# Patient Record
Sex: FEMALE | Race: WHITE | NOT HISPANIC OR LATINO
[De-identification: names, ages, dates, MRNs, and addresses within clinical notes are randomized per-mention and may not be internally consistent; named-entity substitution may affect disease eponyms.]

---

## 2017-01-03 ENCOUNTER — FOLLOW UP (OUTPATIENT)
Age: 57
End: 2017-01-03

## 2017-01-03 DIAGNOSIS — E11.3212: ICD-10-CM

## 2017-01-03 DIAGNOSIS — Z79.4: ICD-10-CM

## 2017-01-03 DIAGNOSIS — E11.3211: ICD-10-CM

## 2017-01-03 PROCEDURE — 92134 CPTRZ OPH DX IMG PST SGM RTA: CPT

## 2017-01-03 PROCEDURE — 92012 INTRM OPH EXAM EST PATIENT: CPT

## 2017-01-03 ASSESSMENT — TONOMETRY
OD_IOP_MMHG: 13
OS_IOP_MMHG: 13

## 2017-01-03 ASSESSMENT — VISUAL ACUITY
OS_CC: 20/30-
OD_PH: 20/25-
OD_CC: 20/30-

## 2017-01-20 ENCOUNTER — FOLLOW UP (OUTPATIENT)
Age: 57
End: 2017-01-20

## 2017-01-20 DIAGNOSIS — H25.13: ICD-10-CM

## 2017-01-20 DIAGNOSIS — E11.3212: ICD-10-CM

## 2017-01-20 DIAGNOSIS — E11.3211: ICD-10-CM

## 2017-01-20 DIAGNOSIS — Z79.4: ICD-10-CM

## 2017-01-20 PROCEDURE — 92012 INTRM OPH EXAM EST PATIENT: CPT | Mod: 25

## 2017-01-20 PROCEDURE — 92134 CPTRZ OPH DX IMG PST SGM RTA: CPT

## 2017-01-20 PROCEDURE — 67028 INJECTION EYE DRUG: CPT

## 2017-01-20 ASSESSMENT — VISUAL ACUITY
OD_PH: 20/30-1
OS_CC: 20/30-1
OD_CC: 20/40-2

## 2017-01-20 ASSESSMENT — TONOMETRY
OD_IOP_MMHG: 14
OS_IOP_MMHG: 16

## 2017-03-07 ENCOUNTER — FOLLOW UP (OUTPATIENT)
Age: 57
End: 2017-03-07

## 2017-03-07 DIAGNOSIS — E11.3211: ICD-10-CM

## 2017-03-07 DIAGNOSIS — Z79.4: ICD-10-CM

## 2017-03-07 DIAGNOSIS — E11.3212: ICD-10-CM

## 2017-03-07 DIAGNOSIS — H25.13: ICD-10-CM

## 2017-03-07 PROCEDURE — 92014 COMPRE OPH EXAM EST PT 1/>: CPT | Mod: 25

## 2017-03-07 PROCEDURE — 92134 CPTRZ OPH DX IMG PST SGM RTA: CPT

## 2017-03-07 PROCEDURE — 67028 INJECTION EYE DRUG: CPT

## 2017-03-07 ASSESSMENT — TONOMETRY
OS_IOP_MMHG: 17
OD_IOP_MMHG: 16

## 2017-03-07 ASSESSMENT — VISUAL ACUITY
OS_CC: 20/40+1
OD_CC: 20/30+1

## 2017-04-18 ENCOUNTER — FOLLOW UP (OUTPATIENT)
Age: 57
End: 2017-04-18

## 2017-04-18 DIAGNOSIS — E11.3211: ICD-10-CM

## 2017-04-18 DIAGNOSIS — E11.3212: ICD-10-CM

## 2017-04-18 DIAGNOSIS — Z79.4: ICD-10-CM

## 2017-04-18 PROCEDURE — 92012 INTRM OPH EXAM EST PATIENT: CPT | Mod: 25

## 2017-04-18 PROCEDURE — 67028 INJECTION EYE DRUG: CPT

## 2017-04-18 PROCEDURE — 92134 CPTRZ OPH DX IMG PST SGM RTA: CPT

## 2017-04-18 ASSESSMENT — VISUAL ACUITY
OS_CC: 20/30-2
OD_PH: 20/25-
OS_PH: 20/30+
OD_CC: 20/30+1

## 2017-04-18 ASSESSMENT — TONOMETRY
OD_IOP_MMHG: 21
OS_IOP_MMHG: 19

## 2017-06-06 ENCOUNTER — FOLLOW UP (OUTPATIENT)
Age: 57
End: 2017-06-06

## 2017-06-06 DIAGNOSIS — H25.13: ICD-10-CM

## 2017-06-06 DIAGNOSIS — E11.3212: ICD-10-CM

## 2017-06-06 DIAGNOSIS — E11.3211: ICD-10-CM

## 2017-06-06 DIAGNOSIS — Z79.4: ICD-10-CM

## 2017-06-06 PROCEDURE — 67028 INJECTION EYE DRUG: CPT

## 2017-06-06 PROCEDURE — 92134 CPTRZ OPH DX IMG PST SGM RTA: CPT

## 2017-06-06 PROCEDURE — 92014 COMPRE OPH EXAM EST PT 1/>: CPT | Mod: 25

## 2017-06-06 ASSESSMENT — VISUAL ACUITY
OD_CC: 20/30-
OS_CC: 20/50+2
OD_PH: 20/25-2
OS_PH: 20/30-

## 2017-06-06 ASSESSMENT — TONOMETRY
OD_IOP_MMHG: 18
OS_IOP_MMHG: 20

## 2017-07-14 ENCOUNTER — APPOINTMENT (RX ONLY)
Dept: URBAN - METROPOLITAN AREA CLINIC 348 | Facility: CLINIC | Age: 57
Setting detail: DERMATOLOGY
End: 2017-07-14

## 2017-07-14 DIAGNOSIS — D22 MELANOCYTIC NEVI: ICD-10-CM

## 2017-07-14 DIAGNOSIS — L82.1 OTHER SEBORRHEIC KERATOSIS: ICD-10-CM

## 2017-07-14 PROBLEM — D22.39 MELANOCYTIC NEVI OF OTHER PARTS OF FACE: Status: ACTIVE | Noted: 2017-07-14

## 2017-07-14 PROBLEM — L85.3 XEROSIS CUTIS: Status: ACTIVE | Noted: 2017-07-14

## 2017-07-14 PROBLEM — E13.9 OTHER SPECIFIED DIABETES MELLITUS WITHOUT COMPLICATIONS: Status: ACTIVE | Noted: 2017-07-14

## 2017-07-14 PROCEDURE — ? OTHER

## 2017-07-14 PROCEDURE — ? TREATMENT REGIMEN

## 2017-07-14 PROCEDURE — 99203 OFFICE O/P NEW LOW 30 MIN: CPT

## 2017-07-14 PROCEDURE — ? COUNSELING

## 2017-07-14 ASSESSMENT — LOCATION SIMPLE DESCRIPTION DERM
LOCATION SIMPLE: CHIN
LOCATION SIMPLE: LEFT CHEEK

## 2017-07-14 ASSESSMENT — LOCATION ZONE DERM: LOCATION ZONE: FACE

## 2017-07-14 ASSESSMENT — LOCATION DETAILED DESCRIPTION DERM
LOCATION DETAILED: LEFT CHIN
LOCATION DETAILED: LEFT INFERIOR CENTRAL MALAR CHEEK

## 2017-07-14 NOTE — PROCEDURE: OTHER
Note Text (......Xxx Chief Complaint.): This diagnosis correlates with the
Detail Level: Simple
Other (Free Text): Photos taken at today's visit

## 2017-08-11 ENCOUNTER — FOLLOW UP (OUTPATIENT)
Age: 57
End: 2017-08-11

## 2017-08-11 DIAGNOSIS — E11.3212: ICD-10-CM

## 2017-08-11 DIAGNOSIS — Z79.4: ICD-10-CM

## 2017-08-11 DIAGNOSIS — H25.13: ICD-10-CM

## 2017-08-11 DIAGNOSIS — E11.3211: ICD-10-CM

## 2017-08-11 PROCEDURE — 92134 CPTRZ OPH DX IMG PST SGM RTA: CPT

## 2017-08-11 PROCEDURE — 92014 COMPRE OPH EXAM EST PT 1/>: CPT

## 2017-08-11 ASSESSMENT — VISUAL ACUITY
OD_CC: 20/30-
OS_CC: 20/50+
OD_PH: 20/25-1
OS_PH: 20/30-

## 2017-08-11 ASSESSMENT — TONOMETRY
OS_IOP_MMHG: 15
OD_IOP_MMHG: 12

## 2017-09-19 ENCOUNTER — FOLLOW UP (OUTPATIENT)
Age: 57
End: 2017-09-19

## 2017-09-19 DIAGNOSIS — E11.3212: ICD-10-CM

## 2017-09-19 DIAGNOSIS — H25.13: ICD-10-CM

## 2017-09-19 DIAGNOSIS — Z79.4: ICD-10-CM

## 2017-09-19 DIAGNOSIS — E11.3211: ICD-10-CM

## 2017-09-19 PROCEDURE — 92012 INTRM OPH EXAM EST PATIENT: CPT

## 2017-09-19 PROCEDURE — 92134 CPTRZ OPH DX IMG PST SGM RTA: CPT

## 2017-09-19 ASSESSMENT — TONOMETRY
OS_IOP_MMHG: 19
OD_IOP_MMHG: 18

## 2017-09-19 ASSESSMENT — VISUAL ACUITY
OS_PH: 20/40
OD_CC: 20/30-1
OS_CC: 20/50-1

## 2017-11-03 ENCOUNTER — FOLLOW UP (OUTPATIENT)
Age: 57
End: 2017-11-03

## 2017-11-03 DIAGNOSIS — E11.3211: ICD-10-CM

## 2017-11-03 DIAGNOSIS — H25.13: ICD-10-CM

## 2017-11-03 DIAGNOSIS — E11.3212: ICD-10-CM

## 2017-11-03 DIAGNOSIS — Z79.4: ICD-10-CM

## 2017-11-03 PROCEDURE — 92134 CPTRZ OPH DX IMG PST SGM RTA: CPT

## 2017-11-03 PROCEDURE — 92014 COMPRE OPH EXAM EST PT 1/>: CPT

## 2017-11-03 ASSESSMENT — VISUAL ACUITY
OD_CC: 20/25
OS_CC: 20/40+1

## 2017-11-03 ASSESSMENT — TONOMETRY
OS_IOP_MMHG: 16
OD_IOP_MMHG: 15

## 2018-02-02 ENCOUNTER — FOLLOW UP (OUTPATIENT)
Age: 58
End: 2018-02-02

## 2018-02-02 DIAGNOSIS — Z79.4: ICD-10-CM

## 2018-02-02 DIAGNOSIS — E11.3211: ICD-10-CM

## 2018-02-02 DIAGNOSIS — E11.3212: ICD-10-CM

## 2018-02-02 DIAGNOSIS — H25.13: ICD-10-CM

## 2018-02-02 PROCEDURE — 92134 CPTRZ OPH DX IMG PST SGM RTA: CPT

## 2018-02-02 PROCEDURE — 92014 COMPRE OPH EXAM EST PT 1/>: CPT

## 2018-02-02 ASSESSMENT — TONOMETRY
OS_IOP_MMHG: 17
OD_IOP_MMHG: 21

## 2018-02-02 ASSESSMENT — VISUAL ACUITY
OS_CC: 20/40-1
OD_CC: 20/30-1

## 2018-03-09 ENCOUNTER — FOLLOW UP (OUTPATIENT)
Age: 58
End: 2018-03-09

## 2018-03-09 DIAGNOSIS — E11.3211: ICD-10-CM

## 2018-03-09 DIAGNOSIS — Z79.4: ICD-10-CM

## 2018-03-09 DIAGNOSIS — E11.3212: ICD-10-CM

## 2018-03-09 PROCEDURE — 92012 INTRM OPH EXAM EST PATIENT: CPT

## 2018-03-09 PROCEDURE — 92134 CPTRZ OPH DX IMG PST SGM RTA: CPT

## 2018-03-09 ASSESSMENT — VISUAL ACUITY
OD_CC: 20/30-1
OS_CC: 20/40-2
OD_PH: 20/25-3
OS_PH: 20/30-1

## 2018-03-09 ASSESSMENT — TONOMETRY
OS_IOP_MMHG: 18
OD_IOP_MMHG: 21

## 2018-05-11 ENCOUNTER — FOLLOW UP (OUTPATIENT)
Age: 58
End: 2018-05-11

## 2018-05-11 DIAGNOSIS — E11.3212: ICD-10-CM

## 2018-05-11 DIAGNOSIS — H25.13: ICD-10-CM

## 2018-05-11 DIAGNOSIS — E11.3211: ICD-10-CM

## 2018-05-11 DIAGNOSIS — Z79.4: ICD-10-CM

## 2018-05-11 PROCEDURE — 92134 CPTRZ OPH DX IMG PST SGM RTA: CPT

## 2018-05-11 PROCEDURE — 92014 COMPRE OPH EXAM EST PT 1/>: CPT

## 2018-05-11 ASSESSMENT — TONOMETRY
OS_IOP_MMHG: 18
OD_IOP_MMHG: 17

## 2018-05-11 ASSESSMENT — VISUAL ACUITY
OD_CC: 20/30-2
OS_CC: 20/50+1

## 2018-09-04 ENCOUNTER — FOLLOW UP (OUTPATIENT)
Age: 58
End: 2018-09-04

## 2018-09-04 DIAGNOSIS — H25.13: ICD-10-CM

## 2018-09-04 DIAGNOSIS — Z79.4: ICD-10-CM

## 2018-09-04 DIAGNOSIS — E11.3212: ICD-10-CM

## 2018-09-04 DIAGNOSIS — E11.3211: ICD-10-CM

## 2018-09-04 PROCEDURE — 92134 CPTRZ OPH DX IMG PST SGM RTA: CPT

## 2018-09-04 PROCEDURE — 92014 COMPRE OPH EXAM EST PT 1/>: CPT

## 2018-09-04 ASSESSMENT — VISUAL ACUITY
OS_CC: 20/50-
OS_PH: 20/30-
OD_CC: 20/25-

## 2018-09-04 ASSESSMENT — TONOMETRY
OD_IOP_MMHG: 15
OS_IOP_MMHG: 14

## 2019-01-09 ENCOUNTER — FOLLOW UP (OUTPATIENT)
Age: 59
End: 2019-01-09

## 2019-01-09 DIAGNOSIS — H43.812: ICD-10-CM

## 2019-01-09 DIAGNOSIS — Z79.4: ICD-10-CM

## 2019-01-09 DIAGNOSIS — E11.3212: ICD-10-CM

## 2019-01-09 DIAGNOSIS — E11.3211: ICD-10-CM

## 2019-01-09 PROCEDURE — 92134 CPTRZ OPH DX IMG PST SGM RTA: CPT

## 2019-01-09 PROCEDURE — 92014 COMPRE OPH EXAM EST PT 1/>: CPT

## 2019-01-09 ASSESSMENT — VISUAL ACUITY
OD_CC: 20/25+2
OS_PH: 20/40-2
OS_CC: 20/70-1

## 2019-01-09 ASSESSMENT — TONOMETRY
OD_IOP_MMHG: 18
OS_IOP_MMHG: 17

## 2019-02-08 ENCOUNTER — FOLLOW UP (OUTPATIENT)
Age: 59
End: 2019-02-08

## 2019-02-08 DIAGNOSIS — E11.3211: ICD-10-CM

## 2019-02-08 DIAGNOSIS — Z79.4: ICD-10-CM

## 2019-02-08 DIAGNOSIS — E11.3212: ICD-10-CM

## 2019-02-08 PROCEDURE — 92012 INTRM OPH EXAM EST PATIENT: CPT

## 2019-02-08 PROCEDURE — 92134 CPTRZ OPH DX IMG PST SGM RTA: CPT

## 2019-02-08 ASSESSMENT — VISUAL ACUITY
OS_PH: 20/60-1
OD_CC: 20/25-2
OS_CC: 20/80+2

## 2019-02-08 ASSESSMENT — TONOMETRY
OD_IOP_MMHG: 20
OS_IOP_MMHG: 18

## 2019-02-22 ENCOUNTER — FOLLOW UP (OUTPATIENT)
Age: 59
End: 2019-02-22

## 2019-02-22 DIAGNOSIS — Z79.4: ICD-10-CM

## 2019-02-22 DIAGNOSIS — E11.3212: ICD-10-CM

## 2019-02-22 PROCEDURE — J0178S EYLEA SAMPLE

## 2019-02-22 PROCEDURE — 67028 INJECTION EYE DRUG: CPT

## 2019-02-22 ASSESSMENT — VISUAL ACUITY
OS_CC: 20/60-2
OD_CC: 20/25-2
OS_PH: 20/50-1

## 2019-02-22 ASSESSMENT — TONOMETRY
OS_IOP_MMHG: 14
OD_IOP_MMHG: 18

## 2019-03-19 ENCOUNTER — FOLLOW UP (OUTPATIENT)
Age: 59
End: 2019-03-19

## 2019-03-19 DIAGNOSIS — E11.3212: ICD-10-CM

## 2019-03-19 DIAGNOSIS — Z79.4: ICD-10-CM

## 2019-03-19 DIAGNOSIS — E11.3211: ICD-10-CM

## 2019-03-19 PROCEDURE — 92134 CPTRZ OPH DX IMG PST SGM RTA: CPT

## 2019-03-19 PROCEDURE — 92014 COMPRE OPH EXAM EST PT 1/>: CPT

## 2019-03-19 ASSESSMENT — TONOMETRY
OS_IOP_MMHG: 16
OD_IOP_MMHG: 18

## 2019-03-19 ASSESSMENT — VISUAL ACUITY
OS_CC: 20/50-2
OD_CC: 20/25-2
OS_PH: 20/40+1

## 2019-03-26 ENCOUNTER — INJECTION ONLY (OUTPATIENT)
Age: 59
End: 2019-03-26

## 2019-03-26 DIAGNOSIS — Z79.4: ICD-10-CM

## 2019-03-26 DIAGNOSIS — E11.3212: ICD-10-CM

## 2019-03-26 PROCEDURE — 67028 INJECTION EYE DRUG: CPT

## 2019-03-26 ASSESSMENT — TONOMETRY
OD_IOP_MMHG: 17
OS_IOP_MMHG: 15

## 2019-03-26 ASSESSMENT — VISUAL ACUITY
OD_CC: 20/25-1
OS_CC: 20/40-1

## 2019-04-26 ENCOUNTER — FOLLOW UP (OUTPATIENT)
Age: 59
End: 2019-04-26

## 2019-04-26 DIAGNOSIS — Z79.4: ICD-10-CM

## 2019-04-26 DIAGNOSIS — E11.3212: ICD-10-CM

## 2019-04-26 DIAGNOSIS — E11.3211: ICD-10-CM

## 2019-04-26 PROCEDURE — 92134 CPTRZ OPH DX IMG PST SGM RTA: CPT

## 2019-04-26 PROCEDURE — 92012 INTRM OPH EXAM EST PATIENT: CPT

## 2019-04-26 ASSESSMENT — VISUAL ACUITY
OS_PH: 20/40+2
OD_CC: 20/30-2
OS_CC: 20/50-2

## 2019-04-26 ASSESSMENT — TONOMETRY
OD_IOP_MMHG: 15
OS_IOP_MMHG: 13

## 2019-06-12 ENCOUNTER — FOLLOW UP (OUTPATIENT)
Age: 59
End: 2019-06-12

## 2019-06-12 DIAGNOSIS — E11.3212: ICD-10-CM

## 2019-06-12 DIAGNOSIS — H25.13: ICD-10-CM

## 2019-06-12 DIAGNOSIS — E11.3211: ICD-10-CM

## 2019-06-12 DIAGNOSIS — Z79.4: ICD-10-CM

## 2019-06-12 PROCEDURE — 92014 COMPRE OPH EXAM EST PT 1/>: CPT

## 2019-06-12 PROCEDURE — 92134 CPTRZ OPH DX IMG PST SGM RTA: CPT

## 2019-06-12 ASSESSMENT — TONOMETRY
OD_IOP_MMHG: 15
OS_IOP_MMHG: 18

## 2019-06-12 ASSESSMENT — VISUAL ACUITY
OS_CC: 20/70
OD_CC: 20/30+2
OS_PH: 20/50+2

## 2019-08-02 ENCOUNTER — FOLLOW UP (OUTPATIENT)
Age: 59
End: 2019-08-02

## 2019-08-02 DIAGNOSIS — E11.3212: ICD-10-CM

## 2019-08-02 DIAGNOSIS — Z79.4: ICD-10-CM

## 2019-08-02 DIAGNOSIS — E11.3211: ICD-10-CM

## 2019-08-02 PROCEDURE — 92134 CPTRZ OPH DX IMG PST SGM RTA: CPT

## 2019-08-02 PROCEDURE — 92012 INTRM OPH EXAM EST PATIENT: CPT

## 2019-08-02 ASSESSMENT — VISUAL ACUITY
OS_CC: 20/125
OS_PH: 20/40-1
OD_CC: 20/25+2

## 2019-08-02 ASSESSMENT — TONOMETRY
OD_IOP_MMHG: 19
OS_IOP_MMHG: 14

## 2019-10-04 ENCOUNTER — FOLLOW UP (OUTPATIENT)
Age: 59
End: 2019-10-04

## 2019-10-04 DIAGNOSIS — E11.3212: ICD-10-CM

## 2019-10-04 DIAGNOSIS — Z79.4: ICD-10-CM

## 2019-10-04 DIAGNOSIS — E11.3211: ICD-10-CM

## 2019-10-04 PROCEDURE — 92014 COMPRE OPH EXAM EST PT 1/>: CPT

## 2019-10-04 PROCEDURE — 92134 CPTRZ OPH DX IMG PST SGM RTA: CPT

## 2019-10-04 ASSESSMENT — TONOMETRY
OD_IOP_MMHG: 17
OS_IOP_MMHG: 18

## 2019-10-04 ASSESSMENT — VISUAL ACUITY
OS_CC: 20/60-1
OD_CC: 20/40+2

## 2019-12-27 ENCOUNTER — FOLLOW UP (OUTPATIENT)
Age: 59
End: 2019-12-27

## 2019-12-27 DIAGNOSIS — E11.3211: ICD-10-CM

## 2019-12-27 DIAGNOSIS — E11.3212: ICD-10-CM

## 2019-12-27 DIAGNOSIS — H25.13: ICD-10-CM

## 2019-12-27 DIAGNOSIS — Z79.4: ICD-10-CM

## 2019-12-27 PROCEDURE — 92134 CPTRZ OPH DX IMG PST SGM RTA: CPT

## 2019-12-27 PROCEDURE — 92014 COMPRE OPH EXAM EST PT 1/>: CPT

## 2019-12-27 ASSESSMENT — VISUAL ACUITY
OD_CC: 20/25-2
OS_CC: 20/125-1

## 2019-12-27 ASSESSMENT — TONOMETRY
OD_IOP_MMHG: 16
OS_IOP_MMHG: 20

## 2020-06-26 ENCOUNTER — FOLLOW UP (OUTPATIENT)
Age: 60
End: 2020-06-26

## 2020-06-26 DIAGNOSIS — Z79.4: ICD-10-CM

## 2020-06-26 DIAGNOSIS — E11.3211: ICD-10-CM

## 2020-06-26 DIAGNOSIS — E11.3212: ICD-10-CM

## 2020-06-26 PROCEDURE — 92134 CPTRZ OPH DX IMG PST SGM RTA: CPT

## 2020-06-26 PROCEDURE — J0178PS EYLEA PFS SAMPLE

## 2020-06-26 PROCEDURE — 92014 COMPRE OPH EXAM EST PT 1/>: CPT

## 2020-06-26 PROCEDURE — 67028 INJECTION EYE DRUG: CPT

## 2020-06-26 ASSESSMENT — VISUAL ACUITY
OS_CC: 20/60
OD_CC: 20/30

## 2020-06-26 ASSESSMENT — TONOMETRY
OD_IOP_MMHG: 14
OS_IOP_MMHG: 16

## 2020-07-24 ENCOUNTER — FOLLOW UP (OUTPATIENT)
Age: 60
End: 2020-07-24

## 2020-07-24 DIAGNOSIS — Z79.4: ICD-10-CM

## 2020-07-24 DIAGNOSIS — E11.3212: ICD-10-CM

## 2020-07-24 DIAGNOSIS — E11.3211: ICD-10-CM

## 2020-07-24 PROCEDURE — 92134 CPTRZ OPH DX IMG PST SGM RTA: CPT

## 2020-07-24 PROCEDURE — 67028 INJECTION EYE DRUG: CPT

## 2020-07-24 PROCEDURE — 92012 INTRM OPH EXAM EST PATIENT: CPT | Mod: 25

## 2020-07-24 ASSESSMENT — VISUAL ACUITY
OS_PH: 20/40
OD_SC: 20/20-2
OS_SC: 20/40-2

## 2020-07-24 ASSESSMENT — TONOMETRY
OD_IOP_MMHG: 14
OS_IOP_MMHG: 15

## 2020-08-28 ENCOUNTER — FOLLOW UP (OUTPATIENT)
Age: 60
End: 2020-08-28

## 2020-08-28 DIAGNOSIS — Z79.4: ICD-10-CM

## 2020-08-28 DIAGNOSIS — E11.3211: ICD-10-CM

## 2020-08-28 DIAGNOSIS — E11.3212: ICD-10-CM

## 2020-08-28 DIAGNOSIS — Z96.1: ICD-10-CM

## 2020-08-28 PROCEDURE — 67028 INJECTION EYE DRUG: CPT

## 2020-08-28 PROCEDURE — 92014 COMPRE OPH EXAM EST PT 1/>: CPT | Mod: 25

## 2020-08-28 PROCEDURE — 92134 CPTRZ OPH DX IMG PST SGM RTA: CPT

## 2020-08-28 ASSESSMENT — VISUAL ACUITY
OS_CC: 20/40-2
OS_PH: 20/30
OD_CC: 20/25-1

## 2020-08-28 ASSESSMENT — TONOMETRY
OD_IOP_MMHG: 10
OS_IOP_MMHG: 12

## 2020-10-13 ENCOUNTER — FOLLOW UP (OUTPATIENT)
Age: 60
End: 2020-10-13

## 2020-10-13 DIAGNOSIS — E11.3212: ICD-10-CM

## 2020-10-13 DIAGNOSIS — Z96.1: ICD-10-CM

## 2020-10-13 DIAGNOSIS — Z79.4: ICD-10-CM

## 2020-10-13 DIAGNOSIS — E11.3211: ICD-10-CM

## 2020-10-13 PROCEDURE — 67028 INJECTION EYE DRUG: CPT

## 2020-10-13 PROCEDURE — 92012 INTRM OPH EXAM EST PATIENT: CPT | Mod: 25

## 2020-10-13 PROCEDURE — 92134 CPTRZ OPH DX IMG PST SGM RTA: CPT

## 2020-10-13 ASSESSMENT — VISUAL ACUITY
OD_CC: 20/25-3
OS_CC: 20/40-1

## 2020-10-13 ASSESSMENT — TONOMETRY
OS_IOP_MMHG: 16
OD_IOP_MMHG: 13

## 2020-11-20 ENCOUNTER — FOLLOW UP (OUTPATIENT)
Age: 60
End: 2020-11-20

## 2020-11-20 DIAGNOSIS — Z79.4: ICD-10-CM

## 2020-11-20 DIAGNOSIS — E11.3212: ICD-10-CM

## 2020-11-20 DIAGNOSIS — E11.3211: ICD-10-CM

## 2020-11-20 PROCEDURE — 92134 CPTRZ OPH DX IMG PST SGM RTA: CPT

## 2020-11-20 PROCEDURE — 92014 COMPRE OPH EXAM EST PT 1/>: CPT | Mod: 25

## 2020-11-20 PROCEDURE — 67028 INJECTION EYE DRUG: CPT

## 2020-11-20 ASSESSMENT — TONOMETRY
OS_IOP_MMHG: 17
OD_IOP_MMHG: 18

## 2020-11-20 ASSESSMENT — VISUAL ACUITY
OD_CC: 20/25+1
OS_CC: 20/30-2

## 2020-12-29 ENCOUNTER — FOLLOW UP (OUTPATIENT)
Age: 60
End: 2020-12-29

## 2020-12-29 DIAGNOSIS — E11.3211: ICD-10-CM

## 2020-12-29 DIAGNOSIS — E11.3212: ICD-10-CM

## 2020-12-29 DIAGNOSIS — Z79.4: ICD-10-CM

## 2020-12-29 DIAGNOSIS — Z96.1: ICD-10-CM

## 2020-12-29 PROCEDURE — 67028 INJECTION EYE DRUG: CPT

## 2020-12-29 PROCEDURE — 92012 INTRM OPH EXAM EST PATIENT: CPT | Mod: 25

## 2020-12-29 PROCEDURE — 92134 CPTRZ OPH DX IMG PST SGM RTA: CPT

## 2020-12-29 ASSESSMENT — TONOMETRY
OS_IOP_MMHG: 12
OD_IOP_MMHG: 14

## 2020-12-29 ASSESSMENT — VISUAL ACUITY
OD_CC: 20/30
OS_CC: 20/40+1

## 2021-02-05 ENCOUNTER — FOLLOW UP (OUTPATIENT)
Age: 61
End: 2021-02-05

## 2021-02-05 DIAGNOSIS — E11.3211: ICD-10-CM

## 2021-02-05 DIAGNOSIS — Z79.4: ICD-10-CM

## 2021-02-05 DIAGNOSIS — E11.3212: ICD-10-CM

## 2021-02-05 PROCEDURE — 92014 COMPRE OPH EXAM EST PT 1/>: CPT | Mod: 25

## 2021-02-05 PROCEDURE — 92134 CPTRZ OPH DX IMG PST SGM RTA: CPT

## 2021-02-05 ASSESSMENT — VISUAL ACUITY
OS_CC: 20/30-1
OD_CC: 20/25+1

## 2021-02-05 ASSESSMENT — TONOMETRY
OS_IOP_MMHG: 10
OD_IOP_MMHG: 12

## 2021-03-19 ENCOUNTER — FOLLOW UP (OUTPATIENT)
Age: 61
End: 2021-03-19

## 2021-03-19 DIAGNOSIS — Z79.4: ICD-10-CM

## 2021-03-19 DIAGNOSIS — E11.3211: ICD-10-CM

## 2021-03-19 DIAGNOSIS — E11.3212: ICD-10-CM

## 2021-03-19 PROCEDURE — 92134 CPTRZ OPH DX IMG PST SGM RTA: CPT

## 2021-03-19 PROCEDURE — 67028 INJECTION EYE DRUG: CPT

## 2021-03-19 PROCEDURE — 99214 OFFICE O/P EST MOD 30 MIN: CPT | Mod: 25

## 2021-03-19 ASSESSMENT — VISUAL ACUITY
OS_CC: 20/40-1
OD_CC: 20/25

## 2021-03-19 ASSESSMENT — TONOMETRY
OD_IOP_MMHG: 14
OS_IOP_MMHG: 12

## 2021-04-30 ENCOUNTER — FOLLOW UP (OUTPATIENT)
Age: 61
End: 2021-04-30

## 2021-04-30 DIAGNOSIS — E11.3212: ICD-10-CM

## 2021-04-30 DIAGNOSIS — Z79.4: ICD-10-CM

## 2021-04-30 DIAGNOSIS — E11.3211: ICD-10-CM

## 2021-04-30 PROCEDURE — 92134 CPTRZ OPH DX IMG PST SGM RTA: CPT

## 2021-04-30 PROCEDURE — 67028 INJECTION EYE DRUG: CPT

## 2021-04-30 PROCEDURE — 92014 COMPRE OPH EXAM EST PT 1/>: CPT | Mod: 25

## 2021-04-30 ASSESSMENT — TONOMETRY
OD_IOP_MMHG: 12
OS_IOP_MMHG: 13

## 2021-04-30 ASSESSMENT — VISUAL ACUITY
OD_CC: 20/30-1
OS_PH: 20/50-2
OS_CC: 20/60-2

## 2021-06-18 ENCOUNTER — FOLLOW UP (OUTPATIENT)
Dept: URBAN - METROPOLITAN AREA CLINIC 78 | Facility: CLINIC | Age: 61
End: 2021-06-18

## 2021-06-18 DIAGNOSIS — Z79.4: ICD-10-CM

## 2021-06-18 DIAGNOSIS — Z96.1: ICD-10-CM

## 2021-06-18 DIAGNOSIS — E11.3213: ICD-10-CM

## 2021-06-18 PROCEDURE — 92134 CPTRZ OPH DX IMG PST SGM RTA: CPT

## 2021-06-18 PROCEDURE — 99214 OFFICE O/P EST MOD 30 MIN: CPT | Mod: 25

## 2021-06-18 PROCEDURE — PFS EYLEA PFS

## 2021-06-18 PROCEDURE — 67028 INJECTION EYE DRUG: CPT

## 2021-06-18 ASSESSMENT — TONOMETRY
OD_IOP_MMHG: 13
OS_IOP_MMHG: 13

## 2021-06-18 ASSESSMENT — VISUAL ACUITY
OS_CC: 20/60+1
OS_PH: 20/40+2
OD_CC: 20/30+1

## 2021-08-13 ENCOUNTER — FOLLOW UP (OUTPATIENT)
Dept: URBAN - METROPOLITAN AREA CLINIC 78 | Facility: CLINIC | Age: 61
End: 2021-08-13

## 2021-08-13 DIAGNOSIS — Z79.4: ICD-10-CM

## 2021-08-13 DIAGNOSIS — E11.3213: ICD-10-CM

## 2021-08-13 PROCEDURE — 67028 INJECTION EYE DRUG: CPT

## 2021-08-13 PROCEDURE — 92014 COMPRE OPH EXAM EST PT 1/>: CPT | Mod: 25

## 2021-08-13 PROCEDURE — PFS EYLEA PFS

## 2021-08-13 PROCEDURE — 92134 CPTRZ OPH DX IMG PST SGM RTA: CPT

## 2021-08-13 ASSESSMENT — TONOMETRY
OS_IOP_MMHG: 20
OD_IOP_MMHG: 20

## 2021-08-13 ASSESSMENT — VISUAL ACUITY
OS_CC: 20/60
OD_CC: 20/30-

## 2021-10-22 ENCOUNTER — FOLLOW UP (OUTPATIENT)
Dept: URBAN - METROPOLITAN AREA CLINIC 78 | Facility: CLINIC | Age: 61
End: 2021-10-22

## 2021-10-22 DIAGNOSIS — Z79.4: ICD-10-CM

## 2021-10-22 DIAGNOSIS — E11.3213: ICD-10-CM

## 2021-10-22 PROCEDURE — PFS EYLEA PFS

## 2021-10-22 PROCEDURE — 92014 COMPRE OPH EXAM EST PT 1/>: CPT | Mod: 25

## 2021-10-22 PROCEDURE — 92134 CPTRZ OPH DX IMG PST SGM RTA: CPT

## 2021-10-22 PROCEDURE — 67028 INJECTION EYE DRUG: CPT

## 2021-10-22 ASSESSMENT — TONOMETRY
OD_IOP_MMHG: 13
OS_IOP_MMHG: 11

## 2021-10-22 ASSESSMENT — VISUAL ACUITY
OS_SC: 20/70
OD_SC: 20/25-1

## 2021-12-21 ENCOUNTER — FOLLOW UP (OUTPATIENT)
Dept: URBAN - METROPOLITAN AREA CLINIC 78 | Facility: CLINIC | Age: 61
End: 2021-12-21

## 2021-12-21 DIAGNOSIS — E11.3213: ICD-10-CM

## 2021-12-21 DIAGNOSIS — Z79.4: ICD-10-CM

## 2021-12-21 PROCEDURE — 67028 INJECTION EYE DRUG: CPT

## 2021-12-21 PROCEDURE — 92134 CPTRZ OPH DX IMG PST SGM RTA: CPT

## 2021-12-21 PROCEDURE — PFS EYLEA PFS

## 2021-12-21 PROCEDURE — 92014 COMPRE OPH EXAM EST PT 1/>: CPT | Mod: 25

## 2021-12-21 ASSESSMENT — VISUAL ACUITY
OS_PH: 20/60-1
OD_CC: 20/25-1
OS_CC: 20/80

## 2021-12-21 ASSESSMENT — TONOMETRY
OS_IOP_MMHG: 10
OD_IOP_MMHG: 12

## 2022-02-08 ENCOUNTER — FOLLOW UP (OUTPATIENT)
Dept: URBAN - METROPOLITAN AREA CLINIC 78 | Facility: CLINIC | Age: 62
End: 2022-02-08

## 2022-02-08 DIAGNOSIS — Z96.1: ICD-10-CM

## 2022-02-08 DIAGNOSIS — E11.3213: ICD-10-CM

## 2022-02-08 DIAGNOSIS — H26.493: ICD-10-CM

## 2022-02-08 DIAGNOSIS — Z79.4: ICD-10-CM

## 2022-02-08 PROCEDURE — PFS EYLEA PFS

## 2022-02-08 PROCEDURE — 92134 CPTRZ OPH DX IMG PST SGM RTA: CPT

## 2022-02-08 PROCEDURE — 99214 OFFICE O/P EST MOD 30 MIN: CPT | Mod: 25

## 2022-02-08 PROCEDURE — 67028 INJECTION EYE DRUG: CPT

## 2022-02-08 ASSESSMENT — VISUAL ACUITY
OS_PH: 20/50
OD_CC: 20/25-2
OS_CC: 20/60-1

## 2022-02-08 ASSESSMENT — TONOMETRY
OS_IOP_MMHG: 11
OD_IOP_MMHG: 16

## 2022-03-29 ENCOUNTER — FOLLOW UP (OUTPATIENT)
Dept: URBAN - METROPOLITAN AREA CLINIC 78 | Facility: CLINIC | Age: 62
End: 2022-03-29

## 2022-03-29 DIAGNOSIS — Z79.4: ICD-10-CM

## 2022-03-29 DIAGNOSIS — E11.3213: ICD-10-CM

## 2022-03-29 PROCEDURE — 92134 CPTRZ OPH DX IMG PST SGM RTA: CPT

## 2022-03-29 PROCEDURE — 67028 INJECTION EYE DRUG: CPT

## 2022-03-29 PROCEDURE — 92014 COMPRE OPH EXAM EST PT 1/>: CPT | Mod: 25

## 2022-03-29 PROCEDURE — PFS EYLEA PFS

## 2022-03-29 ASSESSMENT — VISUAL ACUITY
OD_CC: 20/30+1
OS_PH: 20/40-2
OS_CC: 20/70

## 2022-03-29 ASSESSMENT — TONOMETRY
OD_IOP_MMHG: 17
OS_IOP_MMHG: 16

## 2022-05-24 ENCOUNTER — FOLLOW UP (OUTPATIENT)
Dept: URBAN - METROPOLITAN AREA CLINIC 78 | Facility: CLINIC | Age: 62
End: 2022-05-24

## 2022-05-24 DIAGNOSIS — E11.3213: ICD-10-CM

## 2022-05-24 DIAGNOSIS — Z79.4: ICD-10-CM

## 2022-05-24 PROCEDURE — 92134 CPTRZ OPH DX IMG PST SGM RTA: CPT

## 2022-05-24 PROCEDURE — PFS EYLEA PFS

## 2022-05-24 PROCEDURE — 67028 INJECTION EYE DRUG: CPT

## 2022-05-24 PROCEDURE — 99214 OFFICE O/P EST MOD 30 MIN: CPT | Mod: 25

## 2022-05-24 ASSESSMENT — TONOMETRY
OS_IOP_MMHG: 11
OD_IOP_MMHG: 13

## 2022-05-24 ASSESSMENT — VISUAL ACUITY
OS_PH: 20/30-2
OS_CC: 20/40-1
OD_CC: 20/30-1

## 2022-07-12 ENCOUNTER — FOLLOW UP (OUTPATIENT)
Dept: URBAN - METROPOLITAN AREA CLINIC 78 | Facility: CLINIC | Age: 62
End: 2022-07-12

## 2022-07-12 DIAGNOSIS — E11.3213: ICD-10-CM

## 2022-07-12 DIAGNOSIS — Z79.4: ICD-10-CM

## 2022-07-12 DIAGNOSIS — Z96.1: ICD-10-CM

## 2022-07-12 PROCEDURE — 92134 CPTRZ OPH DX IMG PST SGM RTA: CPT

## 2022-07-12 PROCEDURE — 67028 INJECTION EYE DRUG: CPT

## 2022-07-12 PROCEDURE — PFS EYLEA PFS

## 2022-07-12 PROCEDURE — 92014 COMPRE OPH EXAM EST PT 1/>: CPT | Mod: 25

## 2022-07-12 ASSESSMENT — VISUAL ACUITY
OS_CC: 20/40-2
OD_CC: 20/30+2

## 2022-07-12 ASSESSMENT — TONOMETRY
OS_IOP_MMHG: 12
OD_IOP_MMHG: 15

## 2022-08-30 ENCOUNTER — FOLLOW UP (OUTPATIENT)
Dept: URBAN - METROPOLITAN AREA CLINIC 78 | Facility: CLINIC | Age: 62
End: 2022-08-30

## 2022-08-30 DIAGNOSIS — Z79.4: ICD-10-CM

## 2022-08-30 DIAGNOSIS — E11.3213: ICD-10-CM

## 2022-08-30 PROCEDURE — 99214 OFFICE O/P EST MOD 30 MIN: CPT | Mod: 25

## 2022-08-30 PROCEDURE — 92134 CPTRZ OPH DX IMG PST SGM RTA: CPT

## 2022-08-30 PROCEDURE — PFS EYLEA PFS

## 2022-08-30 PROCEDURE — 67028 INJECTION EYE DRUG: CPT

## 2022-08-30 ASSESSMENT — TONOMETRY
OS_IOP_MMHG: 13
OD_IOP_MMHG: 13

## 2022-08-30 ASSESSMENT — VISUAL ACUITY
OS_CC: 20/40-1
OD_CC: 20/25-1
OS_PH: 20/30

## 2022-10-21 ENCOUNTER — FOLLOW UP (OUTPATIENT)
Dept: URBAN - METROPOLITAN AREA CLINIC 78 | Facility: CLINIC | Age: 62
End: 2022-10-21

## 2022-10-21 DIAGNOSIS — E11.3213: ICD-10-CM

## 2022-10-21 DIAGNOSIS — Z79.4: ICD-10-CM

## 2022-10-21 PROCEDURE — PFS EYLEA PFS

## 2022-10-21 PROCEDURE — 67028 INJECTION EYE DRUG: CPT

## 2022-10-21 PROCEDURE — 99214 OFFICE O/P EST MOD 30 MIN: CPT | Mod: 25

## 2022-10-21 PROCEDURE — 92134 CPTRZ OPH DX IMG PST SGM RTA: CPT

## 2022-10-21 ASSESSMENT — VISUAL ACUITY
OS_CC: 20/50-1
OD_CC: 20/25-1
OS_PH: 20/40-1

## 2022-10-21 ASSESSMENT — TONOMETRY
OD_IOP_MMHG: 14
OS_IOP_MMHG: 14

## 2022-12-06 ENCOUNTER — FOLLOW UP (OUTPATIENT)
Dept: URBAN - METROPOLITAN AREA CLINIC 78 | Facility: CLINIC | Age: 62
End: 2022-12-06

## 2022-12-06 DIAGNOSIS — Z79.4: ICD-10-CM

## 2022-12-06 DIAGNOSIS — E11.3213: ICD-10-CM

## 2022-12-06 DIAGNOSIS — Z96.1: ICD-10-CM

## 2022-12-06 PROCEDURE — 92134 CPTRZ OPH DX IMG PST SGM RTA: CPT

## 2022-12-06 PROCEDURE — PFS EYLEA PFS

## 2022-12-06 PROCEDURE — 92014 COMPRE OPH EXAM EST PT 1/>: CPT | Mod: 25

## 2022-12-06 PROCEDURE — 67028 INJECTION EYE DRUG: CPT

## 2022-12-06 ASSESSMENT — TONOMETRY
OD_IOP_MMHG: 14
OS_IOP_MMHG: 13

## 2022-12-06 ASSESSMENT — VISUAL ACUITY
OD_CC: 20/25
OS_CC: 20/40
OS_PH: 20/30+1

## 2023-01-24 ENCOUNTER — FOLLOW UP (OUTPATIENT)
Dept: URBAN - METROPOLITAN AREA CLINIC 78 | Facility: CLINIC | Age: 63
End: 2023-01-24

## 2023-01-24 DIAGNOSIS — E11.3213: ICD-10-CM

## 2023-01-24 DIAGNOSIS — Z79.4: ICD-10-CM

## 2023-01-24 DIAGNOSIS — Z96.1: ICD-10-CM

## 2023-01-24 PROCEDURE — 92012 INTRM OPH EXAM EST PATIENT: CPT | Mod: 25

## 2023-01-24 PROCEDURE — 92134 CPTRZ OPH DX IMG PST SGM RTA: CPT

## 2023-01-24 PROCEDURE — 67028 INJECTION EYE DRUG: CPT

## 2023-01-24 PROCEDURE — PFS EYLEA PFS

## 2023-01-24 ASSESSMENT — VISUAL ACUITY
OD_CC: 20/25
OS_PH: 20/30-1
OS_CC: 20/40-1

## 2023-01-24 ASSESSMENT — TONOMETRY
OS_IOP_MMHG: 13
OD_IOP_MMHG: 13

## 2023-03-21 ENCOUNTER — FOLLOW UP (OUTPATIENT)
Dept: URBAN - METROPOLITAN AREA CLINIC 78 | Facility: CLINIC | Age: 63
End: 2023-03-21

## 2023-03-21 DIAGNOSIS — E11.3213: ICD-10-CM

## 2023-03-21 DIAGNOSIS — Z79.4: ICD-10-CM

## 2023-03-21 DIAGNOSIS — Z96.1: ICD-10-CM

## 2023-03-21 PROCEDURE — 67028 INJECTION EYE DRUG: CPT

## 2023-03-21 PROCEDURE — 92134 CPTRZ OPH DX IMG PST SGM RTA: CPT

## 2023-03-21 PROCEDURE — PFS EYLEA PFS

## 2023-03-21 PROCEDURE — 99214 OFFICE O/P EST MOD 30 MIN: CPT | Mod: 25

## 2023-03-21 ASSESSMENT — TONOMETRY
OD_IOP_MMHG: 15
OS_IOP_MMHG: 17

## 2023-03-21 ASSESSMENT — VISUAL ACUITY
OS_CC: 20/40-2
OD_CC: 20/25+1

## 2023-05-16 ENCOUNTER — FOLLOW UP (OUTPATIENT)
Dept: URBAN - METROPOLITAN AREA CLINIC 78 | Facility: CLINIC | Age: 63
End: 2023-05-16

## 2023-05-16 DIAGNOSIS — Z79.4: ICD-10-CM

## 2023-05-16 DIAGNOSIS — E11.3213: ICD-10-CM

## 2023-05-16 DIAGNOSIS — Z96.1: ICD-10-CM

## 2023-05-16 PROCEDURE — 92134 CPTRZ OPH DX IMG PST SGM RTA: CPT

## 2023-05-16 PROCEDURE — PFS EYLEA PFS

## 2023-05-16 PROCEDURE — 67028 INJECTION EYE DRUG: CPT

## 2023-05-16 PROCEDURE — 92014 COMPRE OPH EXAM EST PT 1/>: CPT | Mod: 25

## 2023-05-16 ASSESSMENT — VISUAL ACUITY
OS_CC: 20/40-1
OD_CC: 20/30-2

## 2023-05-16 ASSESSMENT — TONOMETRY
OS_IOP_MMHG: 14
OD_IOP_MMHG: 14

## 2023-07-11 ENCOUNTER — FOLLOW UP (OUTPATIENT)
Dept: URBAN - METROPOLITAN AREA CLINIC 78 | Facility: CLINIC | Age: 63
End: 2023-07-11

## 2023-07-11 DIAGNOSIS — E11.3213: ICD-10-CM

## 2023-07-11 DIAGNOSIS — Z79.4: ICD-10-CM

## 2023-07-11 PROCEDURE — 67028 INJECTION EYE DRUG: CPT

## 2023-07-11 PROCEDURE — 92014 COMPRE OPH EXAM EST PT 1/>: CPT | Mod: 25

## 2023-07-11 PROCEDURE — 92134 CPTRZ OPH DX IMG PST SGM RTA: CPT

## 2023-07-11 PROCEDURE — PFS EYLEA PFS: Mod: JZ

## 2023-07-11 ASSESSMENT — VISUAL ACUITY
OS_CC: 20/30-2
OD_CC: 20/30-1

## 2023-07-11 ASSESSMENT — TONOMETRY
OD_IOP_MMHG: 16
OS_IOP_MMHG: 14

## 2023-09-12 ENCOUNTER — FOLLOW UP (OUTPATIENT)
Dept: URBAN - METROPOLITAN AREA CLINIC 78 | Facility: CLINIC | Age: 63
End: 2023-09-12

## 2023-09-12 DIAGNOSIS — E11.3213: ICD-10-CM

## 2023-09-12 DIAGNOSIS — Z79.4: ICD-10-CM

## 2023-09-12 PROCEDURE — 67028 INJECTION EYE DRUG: CPT

## 2023-09-12 PROCEDURE — 92134 CPTRZ OPH DX IMG PST SGM RTA: CPT

## 2023-09-12 PROCEDURE — 92014 COMPRE OPH EXAM EST PT 1/>: CPT | Mod: 25

## 2023-09-12 PROCEDURE — PFS EYLEA PFS: Mod: JZ

## 2023-09-12 ASSESSMENT — TONOMETRY
OD_IOP_MMHG: 13
OS_IOP_MMHG: 14

## 2023-09-12 ASSESSMENT — VISUAL ACUITY
OD_CC: 20/30-2
OS_CC: 20/50

## 2023-11-07 ENCOUNTER — FOLLOW UP (OUTPATIENT)
Dept: URBAN - METROPOLITAN AREA CLINIC 78 | Facility: CLINIC | Age: 63
End: 2023-11-07

## 2023-11-07 DIAGNOSIS — Z96.1: ICD-10-CM

## 2023-11-07 DIAGNOSIS — E11.3213: ICD-10-CM

## 2023-11-07 DIAGNOSIS — Z79.4: ICD-10-CM

## 2023-11-07 PROCEDURE — 67028 INJECTION EYE DRUG: CPT

## 2023-11-07 PROCEDURE — 92134 CPTRZ OPH DX IMG PST SGM RTA: CPT

## 2023-11-07 PROCEDURE — PFS EYLEA PFS: Mod: JZ

## 2023-11-07 PROCEDURE — 92014 COMPRE OPH EXAM EST PT 1/>: CPT | Mod: 25

## 2023-11-07 ASSESSMENT — VISUAL ACUITY
OS_PH: 20/40-1
OD_CC: 20/30
OS_CC: 20/50+2

## 2023-11-07 ASSESSMENT — TONOMETRY
OS_IOP_MMHG: 15
OD_IOP_MMHG: 15

## 2024-01-16 ENCOUNTER — FOLLOW UP (OUTPATIENT)
Dept: URBAN - METROPOLITAN AREA CLINIC 78 | Facility: CLINIC | Age: 64
End: 2024-01-16

## 2024-01-16 DIAGNOSIS — Z79.4: ICD-10-CM

## 2024-01-16 DIAGNOSIS — E11.3213: ICD-10-CM

## 2024-01-16 PROCEDURE — 99214 OFFICE O/P EST MOD 30 MIN: CPT | Mod: 25

## 2024-01-16 PROCEDURE — 92134 CPTRZ OPH DX IMG PST SGM RTA: CPT

## 2024-01-16 PROCEDURE — 67028 INJECTION EYE DRUG: CPT

## 2024-01-16 PROCEDURE — PFS EYLEA PFS: Mod: JZ

## 2024-01-16 ASSESSMENT — VISUAL ACUITY
OD_CC: 20/30-2
OS_CC: 20/70+2
OS_PH: 20/40+2

## 2024-01-16 ASSESSMENT — TONOMETRY
OD_IOP_MMHG: 19
OS_IOP_MMHG: 16

## 2024-03-12 ENCOUNTER — FOLLOW UP (OUTPATIENT)
Dept: URBAN - METROPOLITAN AREA CLINIC 78 | Facility: CLINIC | Age: 64
End: 2024-03-12

## 2024-03-12 DIAGNOSIS — E11.3213: ICD-10-CM

## 2024-03-12 DIAGNOSIS — Z79.4: ICD-10-CM

## 2024-03-12 PROCEDURE — 67028 INJECTION EYE DRUG: CPT

## 2024-03-12 PROCEDURE — 92250 FUNDUS PHOTOGRAPHY W/I&R: CPT

## 2024-03-12 PROCEDURE — 92014 COMPRE OPH EXAM EST PT 1/>: CPT | Mod: 25

## 2024-03-12 PROCEDURE — 92134 CPTRZ OPH DX IMG PST SGM RTA: CPT | Mod: NC

## 2024-03-12 PROCEDURE — HD EYLEA HD 8MG: Mod: JZ

## 2024-03-12 ASSESSMENT — TONOMETRY
OS_IOP_MMHG: 15
OD_IOP_MMHG: 18

## 2024-03-12 ASSESSMENT — VISUAL ACUITY
OD_CC: 20/25+2
OS_CC: 20/40

## 2024-05-07 ENCOUNTER — FOLLOW UP (OUTPATIENT)
Dept: URBAN - METROPOLITAN AREA CLINIC 78 | Facility: CLINIC | Age: 64
End: 2024-05-07

## 2024-05-07 DIAGNOSIS — E11.3213: ICD-10-CM

## 2024-05-07 DIAGNOSIS — Z79.4: ICD-10-CM

## 2024-05-07 PROCEDURE — 92014 COMPRE OPH EXAM EST PT 1/>: CPT | Mod: 25

## 2024-05-07 PROCEDURE — 92134 CPTRZ OPH DX IMG PST SGM RTA: CPT

## 2024-05-07 PROCEDURE — 67028 INJECTION EYE DRUG: CPT

## 2024-05-07 ASSESSMENT — TONOMETRY
OS_IOP_MMHG: 15
OD_IOP_MMHG: 16

## 2024-05-07 ASSESSMENT — VISUAL ACUITY
OD_CC: 20/25+2
OS_CC: 20/40+1